# Patient Record
Sex: MALE | Race: WHITE | NOT HISPANIC OR LATINO
[De-identification: names, ages, dates, MRNs, and addresses within clinical notes are randomized per-mention and may not be internally consistent; named-entity substitution may affect disease eponyms.]

---
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
## 2018-01-12 ENCOUNTER — APPOINTMENT (OUTPATIENT)
Dept: ORTHOPEDIC SURGERY | Facility: CLINIC | Age: 18
End: 2018-01-12
Payer: SELF-PAY

## 2018-01-12 VITALS
DIASTOLIC BLOOD PRESSURE: 63 MMHG | SYSTOLIC BLOOD PRESSURE: 100 MMHG | HEART RATE: 78 BPM | WEIGHT: 185 LBS | HEIGHT: 69 IN | BODY MASS INDEX: 27.4 KG/M2

## 2018-01-12 PROCEDURE — 99214 OFFICE O/P EST MOD 30 MIN: CPT

## 2018-02-23 ENCOUNTER — APPOINTMENT (OUTPATIENT)
Dept: ORTHOPEDIC SURGERY | Facility: CLINIC | Age: 18
End: 2018-02-23
Payer: SELF-PAY

## 2018-02-23 DIAGNOSIS — M54.16 RADICULOPATHY, LUMBAR REGION: ICD-10-CM

## 2018-02-23 DIAGNOSIS — M51.26 OTHER INTERVERTEBRAL DISC DISPLACEMENT, LUMBAR REGION: ICD-10-CM

## 2018-02-23 DIAGNOSIS — M54.42 LUMBAGO WITH SCIATICA, LEFT SIDE: ICD-10-CM

## 2018-02-23 PROCEDURE — 99214 OFFICE O/P EST MOD 30 MIN: CPT

## 2018-04-14 ENCOUNTER — EMERGENCY (EMERGENCY)
Facility: HOSPITAL | Age: 18
LOS: 1 days | Discharge: HOME | End: 2018-04-14
Attending: EMERGENCY MEDICINE
Payer: COMMERCIAL

## 2018-04-14 VITALS
DIASTOLIC BLOOD PRESSURE: 79 MMHG | SYSTOLIC BLOOD PRESSURE: 124 MMHG | RESPIRATION RATE: 20 BRPM | TEMPERATURE: 98 F | HEART RATE: 84 BPM | OXYGEN SATURATION: 97 %

## 2018-04-14 VITALS — HEART RATE: 98 BPM | DIASTOLIC BLOOD PRESSURE: 78 MMHG | RESPIRATION RATE: 17 BRPM | SYSTOLIC BLOOD PRESSURE: 124 MMHG

## 2018-04-14 PROCEDURE — 99283 EMERGENCY DEPT VISIT LOW MDM: CPT | Mod: 25

## 2018-04-14 PROCEDURE — 12011 RPR F/E/E/N/L/M 2.5 CM/<: CPT

## 2018-04-14 NOTE — ED PROVIDER NOTE - PLAN OF CARE
You need to come back to have the sutures removed in 5-7 days. Keep the area dry for 48 hours. If you develop any signs of infection such as spreading redness, fever, thick white discharge please come right back to the emergency room.

## 2018-04-14 NOTE — ED ADULT NURSE REASSESSMENT NOTE - NS ED NURSE REASSESS COMMENT FT1
Patient's wound closed with 6 sutures and 3 steri strips. Patient has no complaints at this time, in no distress. Patient's wound closed with 6 sutures and 3 steri strips by MD Matt. Patient has no complaints at this time, in no distress.

## 2018-04-14 NOTE — ED PROVIDER NOTE - CARE PLAN
Principal Discharge DX:	Laceration of chin  Assessment and plan of treatment:	You need to come back to have the sutures removed in 5-7 days. Keep the area dry for 48 hours. If you develop any signs of infection such as spreading redness, fever, thick white discharge please come right back to the emergency room.

## 2018-04-14 NOTE — ED PROVIDER NOTE - OBJECTIVE STATEMENT
17 Y M with 17 Y M with no pmhx presents after rolling out of bed. He fell and hit his chin after rolling out of bed while sleeping. He states that the bed was about 3 feet from the ground and he had no LOC, no nausea, no vomiting, no numbness, tingling, weakness, no neck pain. He denies injury to any other areas other than a small cut on the tip of his tongue. He feels fine now no HA, minor bleeding, no SOB.

## 2018-04-14 NOTE — ED PROVIDER NOTE - MEDICAL DECISION MAKING DETAILS
Laceration after fall from bead in healthy young male with no concerning features for intracranial injury. will close laceration and DC with suture removal in 5-7 days. Laceration after fall from bead in healthy young male with no concerning features for intracranial injury. will close laceration and DC with suture removal in 5-7 days.  Attending Dsouza: 16 y/o male p resenting after fall with laceration. no visible foreign body. will repair lac, wound care and close return precautions. no focal neurologic deficits or history of bleeding disorders

## 2018-04-14 NOTE — ED PROVIDER NOTE - PHYSICAL EXAMINATION
Tongue: Small 0.25 cm laceration to distal tip of tongue with no active bleeding, no loose teeth of bleeding otherwise in the oral cavity. Tongue: Small 0.25 cm laceration to distal tip of tongue with no active bleeding, no loose teeth of bleeding otherwise in the oral cavity.  Attending Dsouza: Gen: NAD, heent: laceration to bottom of chin approximately 2 cm mild gaping, small lac to tongue,  eomi, perrla,  neck; nttp,  chest: nttp, no crepitus, cv: rrr, no murmurs, lungs: ctab, abd: soft, nontender, nondistended, no peritoneal signs, +BS, no guarding, ext: wwph, neuro: awake and alert, following commands, speech clear, sensation and strength intact, no focal deficits
